# Patient Record
Sex: FEMALE | Race: BLACK OR AFRICAN AMERICAN | NOT HISPANIC OR LATINO | URBAN - METROPOLITAN AREA
[De-identification: names, ages, dates, MRNs, and addresses within clinical notes are randomized per-mention and may not be internally consistent; named-entity substitution may affect disease eponyms.]

---
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
## 2024-06-30 NOTE — PATIENT PROFILE PEDIATRIC. - MEDICATION HERBAL REMEDIES, PROFILE
Bed: RT8  Expected date:   Expected time:   Means of arrival:   Comments:  RTU  
ED Handoff Info      Note: Please review patient's handover report in Epic under Summary tab under ED to IP Handoff      ED Nurse: [place] RN   Extension:  (RTU)      Precautions:    Fall      Violent Patient Behavior BPA   No data recorded      Mental Status: alert and oriented person/place/time calm & cooperative   Baseline? [x] Yes      Cardiac/Tele on arrival: Normal Sinus Rhythm   Baseline? [x] Yes      Respiratory needs on arrival: None   Baseline? [x] Not Applicable      Medications pending and/or not given in ED: None      Pending Labs/Tests to be done on Unit: None      Additional Information:    Coleman Catheter: [x] No    Indication: Not applicable      Preferred Language:  English      Living Arrangement: With spouse      COMMENTS:              
no

## 2025-09-12 DIAGNOSIS — D57.01 HB-SS DISEASE WITH ACUTE CHEST SYNDROME: ICD-10-CM

## 2025-09-17 ENCOUNTER — APPOINTMENT (OUTPATIENT)
Dept: PEDIATRIC HEMATOLOGY/ONCOLOGY | Facility: CLINIC | Age: 20
End: 2025-09-17

## 2025-09-17 RX ORDER — AZITHROMYCIN 250 MG/1
250 TABLET, FILM COATED ORAL
Qty: 2 | Refills: 0 | Status: DISCONTINUED | COMMUNITY
Start: 2025-09-12 | End: 2025-09-17

## 2025-09-17 RX ORDER — BUDESONIDE AND FORMOTEROL FUMARATE DIHYDRATE 80; 4.5 UG/1; UG/1
80-4.5 AEROSOL RESPIRATORY (INHALATION) TWICE DAILY
Qty: 1 | Refills: 3 | Status: ACTIVE | COMMUNITY
Start: 2025-09-17 | End: 1900-01-01

## 2025-09-19 ENCOUNTER — RESULT REVIEW (OUTPATIENT)
Age: 20
End: 2025-09-19

## 2025-09-20 ENCOUNTER — TRANSCRIPTION ENCOUNTER (OUTPATIENT)
Age: 20
End: 2025-09-20

## 2025-09-20 RX ORDER — AMOXICILLIN 500 MG/1
500 CAPSULE ORAL
Qty: 16 | Refills: 0 | Status: DISCONTINUED | COMMUNITY
Start: 2025-09-12 | End: 2025-09-20